# Patient Record
Sex: FEMALE | Race: WHITE | Employment: FULL TIME | URBAN - METROPOLITAN AREA
[De-identification: names, ages, dates, MRNs, and addresses within clinical notes are randomized per-mention and may not be internally consistent; named-entity substitution may affect disease eponyms.]

---

## 2021-08-27 ENCOUNTER — HOSPITAL ENCOUNTER (OUTPATIENT)
Dept: PHYSICAL THERAPY | Age: 36
Discharge: HOME | End: 2021-08-27
Payer: COMMERCIAL

## 2021-08-27 PROCEDURE — 97162 PT EVAL MOD COMPLEX 30 MIN: CPT

## 2021-08-27 PROCEDURE — 97110 THERAPEUTIC EXERCISES: CPT

## 2021-08-27 NOTE — PROGRESS NOTES
[facility]  [address]  [place], [address]  [phone] fax    Plan of Care/ Statement of Necessity for Physical Therapy Services    Patient name: Chandana Graves Start of Care: 2021   Referral source: Sherrin Runner, NP : 1985    Medical Diagnosis: Low back pain [M54.5]  Payor: Live [employer]k / Plan: Pito Reinoso / Product Type: HMO /  Onset Date:, recent exacerbation    Treatment Diagnosis: LBP   Prior Hospitalization: see medical history Provider#: [id number]   Medications: Verified on Patient summary List    Comorbidities: asthma, allergies, LBP   Prior Level of Function: functionally I with daily tasks,      The Plan of Care and following information is based on the information from the initial evaluation. Assessment/ austin information: 63 y/o female presents with long h/o off and on LBP since . No mechanism of injury is reported. The pt reports over the past 2 weeks she has had increased LBP and it does not seem to be going away. She reports pain radiating down B LEs to her knees at times. She denies any numbness, tingling, or weakness. Lumbar AROM flexion is full with minimal pain, extension 75% of full with pain, B SB and rotation 75% of full with pain. Decreased core engagement, decreased abdominal strength and diminished B glute strength is noted. Decreased flexibility of B LE quad and hip flexors. + tenderness to palpation over the lower lumbar paraspinals and over B glute max and piriformis. The pt will benefit from PT to address the aforementioned impairments.  .    Evaluation Complexity History MEDIUM  Complexity : 1-2 comorbidities / personal factors will impact the outcome/ POC ; Examination MEDIUM Complexity : 3 Standardized tests and measures addressing body structure, function, activity limitation and / or participation in recreation  ;Presentation MEDIUM Complexity : Evolving with changing characteristics  ; Clinical Decision Making MEDIUM Complexity : FOTO score of 26-74  Overall Complexity Rating: MEDIUM  Problem List: pain affecting function, decrease ROM, decrease strength, decrease ADL/ functional abilitiies, decrease activity tolerance and decrease flexibility/ joint mobility   Treatment Plan may include any combination of the following: Therapeutic exercise, Therapeutic activities, Neuromuscular re-education, Physical agent/modality, Manual therapy, Aquatic therapy, Patient education and Self Care training  Patient / Family readiness to learn indicated by: asking questions, trying to perform skills and interest  Persons(s) to be included in education: patient (P)  Barriers to Learning/Limitations: None  Patient Goal (s): Gain knowledge of appropriate stretches and find out the underlying cause  Patient Self Reported Health Status: fair  Rehabilitation Potential: fair    Short Term Goals: To be accomplished in 1 weeks:   1. The pt will be I and compliant with HEP     Long Term Goals: To be accomplished in 4 weeks:   1. Improve FOTO score to 69 for improved ability for daily tasks   2. The pt will report no limitation with performing moderate activities like pushing a vacuum. 3. The pt will demonstrate 4+/5 B glute max MMT to improve ability for daily tasks. 4. The pt will demonstrate ability to maintain neutral spine with performing lumbar stabs. Frequency / Duration: Patient to be seen 1-2 times per week for 3-4 weeks. Patient/ Caregiver education and instruction: Diagnosis, prognosis, self care, activity modification and exercises   [x]  Plan of care has been reviewed with ANGEL Wu, PT 8/27/2021 7:42 AM    ________________________________________________________________________    I certify that the above Therapy Services are being furnished while the patient is under my care.  I agree with the treatment plan and certify that this therapy is necessary.     [de-identified] Signature:____________Date:_________TIME:________     Pablo Essex, NP  ** Signature, Date and Time must be completed for valid certification **    Please sign and return to [facility] [address]  [place], [address]  [phone] fax

## 2021-08-31 ENCOUNTER — APPOINTMENT (OUTPATIENT)
Dept: PHYSICAL THERAPY | Age: 36
End: 2021-08-31
Payer: COMMERCIAL

## 2021-09-08 ENCOUNTER — HOSPITAL ENCOUNTER (OUTPATIENT)
Dept: PHYSICAL THERAPY | Age: 36
Discharge: HOME | End: 2021-09-08
Payer: COMMERCIAL

## 2021-09-08 PROCEDURE — 97112 NEUROMUSCULAR REEDUCATION: CPT

## 2021-09-08 PROCEDURE — 97110 THERAPEUTIC EXERCISES: CPT

## 2021-09-08 NOTE — PROGRESS NOTES
PT DAILY TREATMENT NOTE     Patient Name: Tl Cobb  Date:2021  : 1985  [x]  Patient  Verified  Payor: Migue Bazan / Plan: Natanael Hays / Product Type: HMO /    In time: 7:45   Out time: 8:26  Total Treatment Time (min): 41  Visit #: 2 of 4-8    Medicare/BCBS Only   Total Timed Codes (min): 41 1:1 Treatment Time: 39     Treatment Area: Low back pain [M54.5]    SUBJECTIVE  Pain Level (0-10 scale): 1-2  Any medication changes, allergies to medications, adverse drug reactions, diagnosis change, or new procedure performed?: [x] No    [] Yes (see summary sheet for update)  Subjective functional status/changes:   [] No changes reported  Pt reports having minimal pain currently and reports partial compliance with HEP. OBJECTIVE    15 min Therapeutic Exercise:  [x] See flow sheet :    Rationale: increase ROM and increase strength to improve the patients ability to perform ADLs    26 min Neuromuscular Re-education:  [x]  See flow sheet : core/glute stability and re-education exercises. Rationale: increase strength, improve coordination, improve balance and increase proprioception  to improve the patients ability to tolerate functional activities. With   [x] TE   [] TA   [x] neuro   [] other: Patient Education: [x] Review HEP    [] Progressed/Changed HEP based on:   [x] positioning   [x] body mechanics   [] transfers   [] heat/ice application    [] other:      Other Objective/Functional Measures: Initiated exercises/interventions per flow sheet. Good PPT form noted today. Pain Level (0-10 scale) post treatment: 0 soreness    ASSESSMENT/Changes in Function: Reported no pain post session today only soreness. Challenged with maintaining PPT with left LE march in supine versus the right. She moves slowly and with less fluidity with transfers to/from sitting and from the plinth secondary to tightness. Continue POC as tolerated to improve stability and pain.      Patient will continue to benefit from skilled PT services to modify and progress therapeutic interventions, address functional mobility deficits, address ROM deficits, address strength deficits, analyze and address soft tissue restrictions, analyze and cue movement patterns, analyze and modify body mechanics/ergonomics and assess and modify postural abnormalities to attain remaining goals. []  See Plan of Care  []  See progress note/recertification  []  See Discharge Summary         Progress towards goals / Updated goals:  Short Term Goals: To be accomplished in 1 weeks:   1. The pt will be I and compliant with HEP   IE issued HEP   Current: reports partial compliance 9/8/2021  Long Term Goals: To be accomplished in 4 weeks:   1. Improve FOTO score to 69 for improved ability for daily tasks   IE- 56   2. The pt will report no limitation with performing moderate activities like pushing a vacuum. IE- a little limited   3. The pt will demonstrate 4+/5 B glute max MMT to improve ability for daily tasks. IE- 4-/5 B   4. The pt will demonstrate ability to maintain neutral spine with performing lumbar stabs.     IE- poor core engagement    PLAN  [x]  Upgrade activities as tolerated     [x]  Continue plan of care  [x]  Update interventions per flow sheet       []  Discharge due to:_  []  Other:_      Chelsi Ortiz, PT 9/8/2021  7:49 AM    Future Appointments   Date Time Provider Shamika Falk   9/10/2021  7:00 AM Angelena Pilon, Gaylene Angelucci [facility]   9/14/2021  7:00 AM Nidia Palmer PTA [facility]   9/17/2021  7:00 AM Abram Quintana, PT [facility]

## 2021-09-10 ENCOUNTER — HOSPITAL ENCOUNTER (OUTPATIENT)
Dept: PHYSICAL THERAPY | Age: 36
Discharge: HOME | End: 2021-09-10
Payer: COMMERCIAL

## 2021-09-10 PROCEDURE — 97110 THERAPEUTIC EXERCISES: CPT

## 2021-09-10 PROCEDURE — 97112 NEUROMUSCULAR REEDUCATION: CPT

## 2021-09-10 NOTE — PROGRESS NOTES
PT DAILY TREATMENT NOTE     Patient Name: Leelee Miles  Date:9/10/2021  : 1985  [x]  Patient  Verified  Payor: Panda Rosario / Plan: Doretha Mess / Product Type: HMO /    In time:7:00 AM  Out time:7:42 AM  Total Treatment Time (min): 42  Visit #: 3 of 4-8    Medicare/BCBS Only   Total Timed Codes (min):  42 1:1 Treatment Time:  42       Treatment Area: Low back pain [M54.5]    SUBJECTIVE  Pain Level (0-10 scale): 2  Any medication changes, allergies to medications, adverse drug reactions, diagnosis change, or new procedure performed?: [x] No    [] Yes (see summary sheet for update)  Subjective functional status/changes:   [] No changes reported  Patient reports mild soreness after last visit. OBJECTIVE      10 min Therapeutic Exercise:  [x] See flow sheet :   Rationale: increase ROM, increase strength and improve coordination to improve the patients ability to increase ease with ADLs         32 min Neuromuscular Re-education:  [x]  See flow sheet : core and glute activities utilizing core align, reformer, and swiss ball   Rationale: increase strength, improve coordination, improve balance and increase proprioception  to improve the patients ability to improve glute and core awareness              With   [] TE   [] TA   [] neuro   [] other: Patient Education: [x] Review HEP    [] Progressed/Changed HEP based on:   [] positioning   [] body mechanics   [] transfers   [] heat/ice application    [] other:      Other Objective/Functional Measures:   glute max: right: 4+/5. Left, 4-/5      Pain Level (0-10 scale) post treatment: 0    ASSESSMENT/Changes in Function:   Patient with poor core awareness and glute instability noted. She does put forth good effort with session. She reports some \"spasms\" at low back, especially when rising from sit to stand. Frequent cueing required for lumbopelvic dissociation with reformer supine feet in straps, patient able to correct post cueing.      Patient will continue to benefit from skilled PT services to modify and progress therapeutic interventions, address functional mobility deficits, address ROM deficits, address strength deficits, analyze and address soft tissue restrictions, analyze and cue movement patterns, analyze and modify body mechanics/ergonomics and assess and modify postural abnormalities to attain remaining goals. []  See Plan of Care  []  See progress note/recertification  []  See Discharge Summary         Progress towards goals / Updated goals:  Short Term Goals: To be accomplished in 1 weeks:              1. The pt will be I and compliant with HEP              IE issued HEP              Current: reports partial compliance 9/8/2021  Long Term Goals: To be accomplished in 4 weeks:              1. Improve FOTO score to 69 for improved ability for daily tasks              IE- 56              2. The pt will report no limitation with performing moderate activities like pushing a vacuum. IE- a little limited              3. The pt will demonstrate 4+/5 B glute max MMT to improve ability for daily tasks. IE- 4-/5 B   Current: progressing, right: 4+/5. Left: 4-/5 (9/10/21)              4. The pt will demonstrate ability to maintain neutral spine with performing lumbar stabs.                IE- poor core engagement    PLAN  []  Upgrade activities as tolerated     [x]  Continue plan of care  []  Update interventions per flow sheet       []  Discharge due to:_  []  Other:_      René Hollingsworth 9/10/2021  7:02 AM    Future Appointments   Date Time Provider Shamika Falk   9/14/2021  7:00 AM [facility]   9/17/2021  7:00 AM [facility]awn Adair, PT [facility]

## 2021-09-14 ENCOUNTER — HOSPITAL ENCOUNTER (OUTPATIENT)
Dept: PHYSICAL THERAPY | Age: 36
Discharge: HOME | End: 2021-09-14
Payer: COMMERCIAL

## 2021-09-14 PROCEDURE — 97110 THERAPEUTIC EXERCISES: CPT

## 2021-09-14 PROCEDURE — 97112 NEUROMUSCULAR REEDUCATION: CPT

## 2021-09-14 NOTE — PROGRESS NOTES
PT DAILY TREATMENT NOTE     Patient Name: Angelica Johnston  Date:2021  : 1985  [x]  Patient  Verified  Payor: Shefali Schaeffer / Plan: Eleazar Mt / Product Type: HMO /    In time:7:00  Out time:7:40  Total Treatment Time (min): 40  Visit #: 4 of 4-8    Medicare/BCBS Only   Total Timed Codes (min):  40 1:1 Treatment Time:  40       Treatment Area: Low back pain [M54.5]    SUBJECTIVE  Pain Level (0-10 scale): 0/10  Any medication changes, allergies to medications, adverse drug reactions, diagnosis change, or new procedure performed?: [x] No    [] Yes (see summary sheet for update)  Subjective functional status/changes:   [] No changes reported  Pt reports compliance with HEP. Pt reports some pain this morning when driving to PT but reports no pain currently. OBJECTIVE    30 min Therapeutic Exercise:  [x] See flow sheet :   Rationale: increase ROM and increase strength to improve the patients ability to perform ADLs with increased ease. 10 min Neuromuscular Re-education:  [x]  See flow sheet :   Rationale: increase strength, improve coordination and increase proprioception  to improve the patients ability to perform functional activities with increased ease. With   [] TE   [] TA   [] neuro   [] other: Patient Education: [x] Review HEP    [] Progressed/Changed HEP based on:   [] positioning   [] body mechanics   [] transfers   [] heat/ice application    [] other:      Other Objective/Functional Measures: minimal vc's to correct form. Pain Level (0-10 scale) post treatment: 0/10    ASSESSMENT/Changes in Function: Pt demonstrates good control with PPT. Pt has reports of decreased symptoms since IE.       Patient will continue to benefit from skilled PT services to modify and progress therapeutic interventions, address functional mobility deficits, address ROM deficits, address strength deficits, analyze and address soft tissue restrictions, analyze and cue movement patterns and analyze and modify body mechanics/ergonomics to attain remaining goals. []  See Plan of Care  []  See progress note/recertification  []  See Discharge Summary         Progress towards goals / Updated goals:  Short Term Goals: To be accomplished in 1 weeks:              4. The pt will be I and compliant with HEP              IE issued HEP              YNZZODE: reports partial compliance 9/8/2021  Long Term Goals: To be accomplished in 4 weeks:              1. Improve FOTO score to 69 for improved ability for daily tasks              IE- 56              2. The pt will report no limitation with performing moderate activities like pushing a vacuum.               IE- a little limited   Current: pt reports no limitation. 09/14/2021              3. The pt will demonstrate 4+/5 B glute max MMT to improve ability for daily tasks.               IE- 4-/5 B              Current: progressing, right: 4+/5. Left: 4-/5 (9/10/21)              4. The pt will demonstrate ability to maintain neutral spine with performing lumbar stabs.                CV- poor core engagement    PLAN  []  Upgrade activities as tolerated     [x]  Continue plan of care  []  Update interventions per flow sheet       []  Discharge due to:_  []  Other:_      Harsh Almaraz, ANGEL 9/14/2021  7:05 AM    Future Appointments   Date Time Provider Shamika Falk   9/17/2021  7:00 AM Zaire Benoit, PT [facility]

## 2021-09-17 ENCOUNTER — HOSPITAL ENCOUNTER (OUTPATIENT)
Dept: PHYSICAL THERAPY | Age: 36
Discharge: HOME | End: 2021-09-17
Payer: COMMERCIAL

## 2021-09-17 PROCEDURE — 97112 NEUROMUSCULAR REEDUCATION: CPT

## 2021-09-17 PROCEDURE — 97110 THERAPEUTIC EXERCISES: CPT

## 2021-09-17 NOTE — PROGRESS NOTES
PT DAILY TREATMENT NOTE     Patient Name: Watson Stevenson  Date:2021  : 1985  [x]  Patient  Verified  Payor: Ailyn Bravo / Plan: Dakota Dias / Product Type: HMO /    In time:0700  Out time:744  Total Treatment Time (min): 44  Visit #: 5 of 8    Medicare/BCBS Only   Total Timed Codes (min):  44 1:1 Treatment Time:  44       Treatment Area: Low back pain [M54.5]    SUBJECTIVE  Pain Level (0-10 scale): 2 - 2.5  Any medication changes, allergies to medications, adverse drug reactions, diagnosis change, or new procedure performed?: [x] No    [] Yes (see summary sheet for update)  Subjective functional status/changes:   [] No changes reported  The pt reports PT is helping and she has noticed a \"definite difference\". Pain is not as constant. OBJECTIVE         32 min Therapeutic Exercise:  [x] See flow sheet :   Rationale: increase ROM and increase strength to improve the patients ability to perform ADL    12 min Neuromuscular Re-education:  [x]  See flow sheet : Core Align and Reformer series. Rationale: increase strength, improve coordination and increase proprioception  to improve the patients ability to perform ADL        With   [] TE   [] TA   [] neuro   [] other: Patient Education: [x] Review HEP    [] Progressed/Changed HEP based on:   [] positioning   [] body mechanics   [] transfers   [] heat/ice application    [] other:      Other Objective/Functional Measures: increased reps per flow sheet, increased Core Align resistance     Pain Level (0-10 scale) post treatment: 0    ASSESSMENT/Changes in Function: The pt is progressing well with PT. Decreased pain is reported and lumbar stability and glute strength are progressing. Good relief noted post treatment.       Patient will continue to benefit from skilled PT services to modify and progress therapeutic interventions, address functional mobility deficits, address ROM deficits, address strength deficits, analyze and address soft tissue restrictions, analyze and cue movement patterns, analyze and modify body mechanics/ergonomics and assess and modify postural abnormalities to attain remaining goals. []  See Plan of Care  []  See progress note/recertification  []  See Discharge Summary         Progress towards goals / Updated goals:  Short Term Goals: To be accomplished in 1 weeks:              8. The pt will be I and compliant with HEP              IE issued HEP              YIUTIAG: reports performing HEP on 9-17-21  Long Term Goals: To be accomplished in 4 weeks:              1. Improve FOTO score to 69 for improved ability for daily tasks              IE- 56              2. The pt will report no limitation with performing moderate activities like pushing a vacuum.               IE- a little limited              Current: pt reports no limitation. 09/14/2021              3. The pt will demonstrate 4+/5 B glute max MMT to improve ability for daily tasks.               IE- 4-/5 B              Current: progressing, right: 4+/5. Left: 4-/5 (9/10/21)              4. The pt will demonstrate ability to maintain neutral spine with performing lumbar stabs.             IE- poor core engagement   Current: improving as noted with therex 9-17-21    PLAN  [x]  Upgrade activities as tolerated     [x]  Continue plan of care  []  Update interventions per flow sheet       []  Discharge due to:_  []  Other:_      Sierra Cat, PT 9/17/2021  7:11 AM    No future appointments.

## 2021-09-27 ENCOUNTER — HOSPITAL ENCOUNTER (OUTPATIENT)
Dept: PHYSICAL THERAPY | Age: 36
Discharge: HOME | End: 2021-09-27
Payer: COMMERCIAL

## 2021-09-27 PROCEDURE — 97112 NEUROMUSCULAR REEDUCATION: CPT

## 2021-09-27 PROCEDURE — 97110 THERAPEUTIC EXERCISES: CPT

## 2021-09-27 NOTE — PROGRESS NOTES
[facility] [place]  [address]  [place], [address]  [phone] fax    Physical Therapy Progress Note  Patient name: Ashtyn Ortiz Start of Care: 2021   Referral source: Андрей Callejas NP : 1985   Medical/Treatment Diagnosis: Low back pain [M54.5]  Payor: BLUE CROSS / Plan: [employer] / Product Type: HMO /  Onset DJOB:1218, recent exacerbation             Prior Hospitalization: see medical history Provider#: [id number]   Medications: Verified on Patient Summary List    Comorbidities: asthma, allergies, LBP  Prior Level of Function:functionally I with daily tasks,   Visits from Start of Care: 6    Missed Visits: 0      Key Functional Changes:     Progress towards goals:  Short Term Goals: To be accomplished in 1 weeks:              1. The pt will be I and compliant with HEP              IE issued HEP              EOSZVZZ: reports performing HEP. Long Term Goals: To be accomplished in 4 weeks:              1. Improve FOTO score to 69 for improved ability for daily tasks              IE- 56              Current: Met, 94%.              2. The pt will report no limitation with performing moderate activities like pushing a vacuum.               IE- a little limited              Current: pt reports no limitation.              3. The pt will demonstrate 4+/5 B glute max MMT to improve ability for daily tasks.               IE- 4-/5 B              Current: progressing, right: 4+/5. Left: 4-/5.              4. The pt will demonstrate ability to maintain neutral spine with performing lumbar stabs.             ZL- poor core engagement              Current: improving as noted with therex. FOTO 94%. Pt reports 90% overall subjective improvement. Reports occasional twinges in low back, primarily with prolonged sitting/driving and sit to stand transfers.  Fair mechanics with sit to stands, when cued not to use UE's pt stated \"it feels weird not to use my arms. \" Presented with increased trunk flexion and poor eccentric control with sit to stands. Updated Goals: to be achieved in 2-4 weeks:   Long Term Goals: To be accomplished in 4 weeks:              1. The pt will demonstrate 4+/5 B glute max MMT to improve ability for daily tasks.               PN: progressing, right: 4+/5. Left: 4-/5.              2. The pt will demonstrate ability to maintain neutral spine with performing lumbar stabs.             UW: improving as noted with therex. 3. The pt will demonstrate proper squats and sit to stand mechanics to improve ease of performing (I) transfers   PN: Increased trunk flexion and poor eccentric control. ASSESSMENT/RECOMMENDATIONS:  Pt would benefit from continuing PT for additional 1x a week for 4 weeks to increase sitting/standing posture and squat mechanics to decrease risk of re-injury. [x]Continue therapy per initial plan/protocol at a frequency of  1 x per week for 2-4 weeks  []Continue therapy with the following recommended changes:_____________________      _____________________________________________________________________  []Discontinue therapy progressing towards or have reached established goals  []Discontinue therapy due to lack of appreciable progress towards goals  []Discontinue therapy due to lack of attendance or compliance  []Await Physician's recommendations/decisions regarding therapy  []Other:________________________________________________________________    Thank you for this referral.    Salud Prajapati, PTA 9/27/2021 3:55 PM  NOTE TO PHYSICIAN:  [address] [facility] [phone]  If you are unable to process this request in 24 hours please contact our office: [phone]    ? I have read the above report and request that my patient continue as recommended.   ? I have read the above report and request that my patient continue therapy with the following changes/special instructions:__________________________________________________________  ? I have read the above report and request that my patient be discharged from therapy.     Physicians signature: ______________________________Date: ______Time:______     Praveen Mckeon NP

## 2021-09-27 NOTE — PROGRESS NOTES
PT DAILY TREATMENT NOTE     Patient Name: Filemon Truong  Date:2021  : 1985  [x]  Patient  Verified  Payor: Mikhail Blanchard / Plan: Don Melendez / Product Type: HMO /    In time:3:00  Out time:3:45  Total Treatment Time (min): 45  Visit #: 6 of 8    Medicare/BCBS Only   Total Timed Codes (min):  45 1:1 Treatment Time:  41       Treatment Area: Low back pain [M54.5]    SUBJECTIVE  Pain Level (0-10 scale): 0/10  Any medication changes, allergies to medications, adverse drug reactions, diagnosis change, or new procedure performed?: [x] No    [] Yes (see summary sheet for update)  Subjective functional status/changes:   [] No changes reported  \"Today is the best I've felt in months. \"    OBJECTIVE    16 min Therapeutic Exercise:  [x] See flow sheet :   Rationale: increase ROM and increase strength to improve the patients ability to perform ADL's.    25 min Neuromuscular Re-education:  [x]  See flow sheet : Sit to stands, CoreAlign, Reformer. Rationale: increase strength, improve coordination and increase proprioception  to improve the patients ability to perform functional activities. With   [x] TE   [] TA   [] neuro   [] other: Patient Education: [x] Review HEP    [] Progressed/Changed HEP based on:   [] positioning   [] body mechanics   [] transfers   [] heat/ice application    [] other:      Other Objective/Functional Measures: FOTO 94%. Pt reports 90% overall subjective improvement. Reports occasional twinges in low back, primarily with prolonged sitting/driving and sit to stand transfers. Fair mechanics with sit to stands, when cued not to use UE's pt stated \"it feels weird not to use my arms. \" Presented with increased trunk flexion and poor eccentric control with sit to stands. Pt has made significant improvement since IE. Pt reports 90% improvement.  Pt would benefit from continuing PT for additional 1x a week for 4 weeks to increase sitting/standing posture and squat mechanics to decrease risk of re-injury. Pain Level (0-10 scale) post treatment: 0/10     ASSESSMENT/Changes in Function:      []  See Plan of Care  [x]  See progress note/recertification  []  See Discharge Summary         Progress towards goals / Updated goals:  Short Term Goals: To be accomplished in 1 weeks:              2. The pt will be I and compliant with HEP              IE issued HEP              [id number]: reports performing HEP on 9-17-21  Long Term Goals: To be accomplished in 4 weeks:              1. Improve FOTO score to 69 for improved ability for daily tasks              IE- 56   Current: Met, 94%. 9/27/2021              2. The pt will report no limitation with performing moderate activities like pushing a vacuum.               IE- a little limited              Current: pt reports no limitation. 09/14/2021              3. The pt will demonstrate 4+/5 B glute max MMT to improve ability for daily tasks.               IE- 4-/5 B              Current: progressing, right: 4+/5. Left: 4-/5 (9/10/21)              4. The pt will demonstrate ability to maintain neutral spine with performing lumbar stabs.                ID- poor core engagement              Current: improving as noted with therex 9-17-21    PLAN  []  Upgrade activities as tolerated     [x]  Continue plan of care  []  Update interventions per flow sheet       []  Discharge due to:_  []  Other:_      General Jarett PTA 9/27/2021  3:06 PM    Future Appointments   Date Time Provider Shamika Falk   9/29/2021  3:45 PM Gonzalez Ochoa PTA [facility]   10/4/2021  7:00 AM Tj Guerrero, PT [facility]

## 2021-09-29 ENCOUNTER — HOSPITAL ENCOUNTER (OUTPATIENT)
Dept: PHYSICAL THERAPY | Age: 36
Discharge: HOME | End: 2021-09-29
Payer: COMMERCIAL

## 2021-09-29 PROCEDURE — 97110 THERAPEUTIC EXERCISES: CPT

## 2021-09-29 PROCEDURE — 97112 NEUROMUSCULAR REEDUCATION: CPT

## 2021-09-29 NOTE — PROGRESS NOTES
PT DAILY TREATMENT NOTE     Patient Name: Chelsey Lucio  Date:2021  : 1985  [x]  Patient  Verified  Payor: Aldo Collazo / Plan: Nory Quale / Product Type: HMO /    In time:3:45  Out time:4:28  Total Treatment Time (min): 43  Visit #: 1 of 2-4    Medicare/BCBS Only   Total Timed Codes (min):  43 1:1 Treatment Time:  43       Treatment Area: Low back pain [M54.5]    SUBJECTIVE  Pain Level (0-10 scale): 0/10  Any medication changes, allergies to medications, adverse drug reactions, diagnosis change, or new procedure performed?: [x] No    [] Yes (see summary sheet for update)  Subjective functional status/changes:   [] No changes reported  Pt reports no new complaints of pain. Pt reports compliance with HEP. OBJECTIVE    33 min Therapeutic Exercise:  [x] See flow sheet :   Rationale: increase ROM and increase strength to improve the patients ability to perform ADLs with increased ease. 10 min Neuromuscular Re-education:  [x]  See flow sheet :   Rationale: increase strength, improve coordination and increase proprioception  to improve the patients ability to perform ADLs with increased ease. With   [] TE   [] TA   [] neuro   [] other: Patient Education: [x] Review HEP    [] Progressed/Changed HEP based on:   [] positioning   [] body mechanics   [] transfers   [] heat/ice application    [] other:      Other Objective/Functional Measures: Frequent LOB with seated SB exercises. Pain Level (0-10 scale) post treatment: 0/10    ASSESSMENT/Changes in Function: Pt demonstrates good form with PPT. Pt has no adverse reaction to treatment.      Patient will continue to benefit from skilled PT services to modify and progress therapeutic interventions, address functional mobility deficits, address ROM deficits, address strength deficits, analyze and address soft tissue restrictions, analyze and cue movement patterns and analyze and modify body mechanics/ergonomics to attain remaining goals.     []  See Plan of Care  []  See progress note/recertification  []  See Discharge Summary         Progress towards goals / Updated goals:  Updated Goals: to be achieved in 2-4 weeks:              Long Term Goals: To be accomplished in 4 weeks:              1. The pt will demonstrate 4+/5 B glute max MMT to improve ability for daily tasks.               PN: progressing, right: 4+/5. Left: 4-/5.              2. The pt will demonstrate ability to maintain neutral spine with performing lumbar stabs.             UV: improving as noted with therex. 3. The pt will demonstrate proper squats and sit to stand mechanics to improve ease of performing (I) transfers              PN: Increased trunk flexion and poor eccentric control.     PLAN  []  Upgrade activities as tolerated     [x]  Continue plan of care  []  Update interventions per flow sheet       []  Discharge due to:_  []  Other:_      Irina Tijerina, PTA 9/29/2021  3:49 PM    Future Appointments   Date Time Provider Shamika Moultoni   10/4/2021  7:00 AM Huang Lopez, PT [facility]   10/11/2021  3:45 PM Malick Landin, PTA [facility]   10/18/2021  3:45 PM Emerald Taylor, PTA [facility]   10/25/2021  3:45 PM Malick Ladnin, PTA [facility]

## 2021-10-04 ENCOUNTER — HOSPITAL ENCOUNTER (OUTPATIENT)
Dept: PHYSICAL THERAPY | Age: 36
Discharge: HOME | End: 2021-10-04
Payer: COMMERCIAL

## 2021-10-04 PROCEDURE — 97110 THERAPEUTIC EXERCISES: CPT

## 2021-10-04 PROCEDURE — 97112 NEUROMUSCULAR REEDUCATION: CPT

## 2021-10-04 NOTE — PROGRESS NOTES
PT DAILY TREATMENT NOTE     Patient Name: Edmond Garcia  Date:10/4/2021  : 1985  [x]  Patient  Verified  Payor: Brooklyn Traore / Plan: Ivory Grounds / Product Type: HMO /    In time:0700  Out time:742  Total Treatment Time (min): 42  Visit #: 2 of 2-4    Medicare/BCBS Only   Total Timed Codes (min):  42 1:1 Treatment Time:  42       Treatment Area: Low back pain [M54.5]    SUBJECTIVE  Pain Level (0-10 scale): 1  Any medication changes, allergies to medications, adverse drug reactions, diagnosis change, or new procedure performed?: [x] No    [] Yes (see summary sheet for update)  Subjective functional status/changes:   [] No changes reported  The pt reports some minor pain this morning. She states it's the first time she has had pain in a couple of weeks. OBJECTIVE      30 min Therapeutic Exercise:  [x] See flow sheet :   Rationale: increase ROM and increase strength to improve the patients ability to perform ADL     12 min Neuromuscular Re-education:  [x]  See flow sheet :   Rationale: increase strength, improve coordination and increase proprioception  to improve the patients ability to perform ADL            With   [] TE   [] TA   [] neuro   [] other: Patient Education: [x] Review HEP    [] Progressed/Changed HEP based on:   [] positioning   [] body mechanics   [] transfers   [] heat/ice application    [] other:      Other Objective/Functional Measures: added march with PPT. increased Reformer reps     Pain Level (0-10 scale) post treatment: 0.5    ASSESSMENT/Changes in Function:  The pt is progressing with lumbar stability but is still limited. Overall her pain has been well controlled but she does report some minor pain upon arrival. Her pain diminished post treatment.      Patient will continue to benefit from skilled PT services to modify and progress therapeutic interventions, address functional mobility deficits, address ROM deficits, address strength deficits, analyze and address soft tissue restrictions and analyze and cue movement patterns to attain remaining goals. []  See Plan of Care  []  See progress note/recertification  []  See Discharge Summary         Progress towards goals / Updated goals:  Updated Goals: to be achieved in 2-4 weeks:              Long Term Goals: To be accomplished in 4 weeks:              1. The pt will demonstrate 4+/5 B glute max MMT to improve ability for daily tasks.               PN: progressing, right: 4+/5. Left: 4-/5.              2. The pt will demonstrate ability to maintain neutral spine with performing lumbar stabs.             PN: improving as noted with therex. Current: progressing improvement noted  10-4-21              3. The pt will demonstrate proper squats and sit to stand mechanics to improve ease of performing (I) transfers              PN: Increased trunk flexion and poor eccentric control.     PLAN  []  Upgrade activities as tolerated     [x]  Continue plan of care  []  Update interventions per flow sheet       []  Discharge due to:_  []  Other:_      Maksim Trinidad, PT 10/4/2021  7:11 AM    Future Appointments   Date Time Provider Shamika Falk   10/11/2021  3:45 PM Osmel Glover PTA [facility]   10/18/2021  3:45 PM Stef Peraza PTA [facility]   10/25/2021  3:45 PM Osmel Glover PTA [facility]

## 2021-10-11 ENCOUNTER — HOSPITAL ENCOUNTER (OUTPATIENT)
Dept: PHYSICAL THERAPY | Age: 36
Discharge: HOME | End: 2021-10-11
Payer: COMMERCIAL

## 2021-10-11 PROCEDURE — 97112 NEUROMUSCULAR REEDUCATION: CPT

## 2021-10-11 PROCEDURE — 97110 THERAPEUTIC EXERCISES: CPT

## 2021-10-11 NOTE — PROGRESS NOTES
PT DAILY TREATMENT NOTE     Patient Name: Flores Mosley  Date:10/11/2021  : 1985  [x]  Patient  Verified  Payor: Isabel Chávez / Plan: Christiano Stout / Product Type: HMO /    In time:3:45  Out time:4:30  Total Treatment Time (min): 45  Visit #: 3 of 2-4    Medicare/BCBS Only   Total Timed Codes (min):  45 1:1 Treatment Time:  45       Treatment Area: Low back pain [M54.5]    SUBJECTIVE  Pain Level (0-10 scale): 0/10  Any medication changes, allergies to medications, adverse drug reactions, diagnosis change, or new procedure performed?: [x] No    [] Yes (see summary sheet for update)  Subjective functional status/changes:   [] No changes reported  Pt reports no new complaints of pain. OBJECTIVE    35 min Therapeutic Exercise:  [x] See flow sheet :   Rationale: increase ROM and increase strength to improve the patients ability to perform ADLs with increased ease. 10 min Neuromuscular Re-education:  [x]  See flow sheet :   Rationale: increase strength, improve coordination and increase proprioception  to improve the patients ability to perform ADLs with increased ease. With   [] TE   [] TA   [] neuro   [] other: Patient Education: [x] Review HEP    [] Progressed/Changed HEP based on:   [] positioning   [] body mechanics   [] transfers   [] heat/ice application    [] other:      Other Objective/Functional Measures:      Pain Level (0-10 scale) post treatment: 0/10    ASSESSMENT/Changes in Function: Pt continues to have difficulty performing SB exercises due to core weakness. Patient will continue to benefit from skilled PT services to modify and progress therapeutic interventions, address functional mobility deficits, address ROM deficits, address strength deficits, analyze and address soft tissue restrictions, analyze and cue movement patterns and analyze and modify body mechanics/ergonomics to attain remaining goals.      []  See Plan of Care  []  See progress note/recertification  [] See Discharge Summary         Progress towards goals / Updated goals:  Updated Goals: to be achieved in 2-4 weeks:              Long Term Goals: To be accomplished in 4 weeks:              1. The pt will demonstrate 4+/5 B glute max MMT to improve ability for daily tasks.               PN: progressing, right: 4+/5. Left: 4-/5.              2. The pt will demonstrate ability to maintain neutral spine with performing lumbar stabs.             PN: improving as noted with therex. Current: progressing improvement noted  10-4-21              3. The pt will demonstrate proper squats and sit to stand mechanics to improve ease of performing (I) transfers              PN: Increased trunk flexion and poor eccentric control.     PLAN  []  Upgrade activities as tolerated     [x]  Continue plan of care  []  Update interventions per flow sheet       []  Discharge due to:_  []  Other:_      Martha Robles PTA 10/11/2021  3:52 PM    Future Appointments   Date Time Provider Shamika Falk   10/18/2021  3:45 PM [facility]   10/25/2021  3:45 PM [facility]

## 2021-10-18 ENCOUNTER — HOSPITAL ENCOUNTER (OUTPATIENT)
Dept: PHYSICAL THERAPY | Age: 36
Discharge: HOME | End: 2021-10-18
Payer: COMMERCIAL

## 2021-10-18 PROCEDURE — 97112 NEUROMUSCULAR REEDUCATION: CPT

## 2021-10-18 PROCEDURE — 97110 THERAPEUTIC EXERCISES: CPT

## 2021-10-18 NOTE — PROGRESS NOTES
[facility]   [address]  [place], [address]  [phone] fax    Physical Therapy Discharge Summary  Patient name: Rocio Dueñas Start of Care: 2021   Referral source: Fran Yan NP : 1985   Medical/Treatment Diagnosis: Low back pain [M54.5]  Payor: Greg Lehman / Plan: Salud Hernandez / Product Type: HMO /  Onset [id number], recent exacerbation     Prior Hospitalization: see medical history Provider#: [id number]   Medications: Verified on Patient Summary List    Comorbidities: asthma, allergies, LBP  Prior Level of Function:functionally I with daily tasks,   Visits from Start of Care: 10    Missed Visits: 0  Reporting Period : 2021 to 10/18/2021      Summary of Care:    [facility] be accomplished in 4 weeks:              1. The pt will demonstrate 4+/5 B glute max MMT to improve ability for daily tasks.               PN: progressing, right: 4+/5. Left: 4-/5. Discharge: Met, MMT Glute max Left 4+/5, Right 4+/5. 10/18/2021              2. The pt will demonstrate ability to maintain neutral spine with performing lumbar stabs.             PN: improving as noted with therex.              Discharge: progressing improvement noted  10-4-21              3. The pt will demonstrate proper squats and sit to stand mechanics to improve ease of performing (I) transfers              PN: Increased trunk flexion and poor eccentric control. Discharge: Demonstrates good squat and sit to stand mechanics void of pain. 10/18/2021      Pt has made significant progress since IE. Pt reports 90-95% overall subjective improvement. MMT Glute max Left 4+/5, Right 4+/5. Met all LTG's. Demonstrates good squat and sit to stand mechanics void of pain. Met LTG's #1 and #3.  Partially met LTG #2, core fatigues quickly and unable to maintain neutral throughout duration of treatment however pt takes short breaks to reset core activation. D/C to HEP at this time. Pt had no further questions or concerns and thanked staff for services provided.     ASSESSMENT/RECOMMENDATIONS:  [x]Discontinue therapy: [x]Patient has reached or is progressing toward set goals      []Patient is non-compliant or has abdicated      []Due to lack of appreciable progress towards set goals    Janette Waggoner, PTA 10/18/2021 4:40 PM

## 2021-10-18 NOTE — PROGRESS NOTES
PT DAILY TREATMENT NOTE     Patient Name: Pretty Mcleod  Date:10/18/2021  : 1985  [x]  Patient  Verified  Payor: Lesly Christensen / Plan: Alicia Antoine / Product Type: HMO /    In time:3:47  Out time:4:28  Total Treatment Time (min): 41  Visit #: 4 of 3-4    Medicare/BCBS Only   Total Timed Codes (min):  41 1:1 Treatment Time:  41       Treatment Area: Low back pain [M54.5]    SUBJECTIVE  Pain Level (0-10 scale): 0/10  Any medication changes, allergies to medications, adverse drug reactions, diagnosis change, or new procedure performed?: [x] No    [] Yes (see summary sheet for update)  Subjective functional status/changes:   [] No changes reported  \"Been feeling really good. Haven't had any back pain lately. \"    OBJECTIVE    25 min Therapeutic Exercise:  [x] See flow sheet :   Rationale: increase ROM and increase strength to improve the patients ability to perform ADL's.    16 min Neuromuscular Re-education:  [x]  See flow sheet : CoreAlign, Reformer. Rationale: increase strength, improve coordination and increase proprioception  to improve the patients ability to perform functional activities. With   [x] TE   [] TA   [] neuro   [] other: Patient Education: [x] Review HEP    [] Progressed/Changed HEP based on:   [] positioning   [] body mechanics   [] transfers   [] heat/ice application    [] other:      Other Objective/Functional Measures: Pt has made significant progress since IE. Pt reports 90-95% overall subjective improvement. MMT Glute max Left 4+/5, Right 4+/5. Met all LTG's. Demonstrates good squat and sit to stand mechanics void of pain. Met LTG's #1 and #3. Partially met LTG #2, core fatigues quickly and unable to maintain neutral throughout duration of treatment however pt takes short breaks to reset core activation. D/C to HEP at this time. Pt had no further questions or concerns and thanked staff for services provided.     Pain Level (0-10 scale) post treatment: 0/10    ASSESSMENT/Changes in Function:     []  See Plan of Care  []  See progress note/recertification  [x]  See Discharge Summary         Progress towards goals / Updated goals:  Updated Goals: to be achieved in 2-4 weeks:              Long Term Goals: To be accomplished in 4 weeks:              1. The pt will demonstrate 4+/5 B glute max MMT to improve ability for daily tasks.               PN: progressing, right: 4+/5. Left: 4-/5. Current: Met, MMT Glute max Left 4+/5, Right 4+/5. 10/18/2021              2. The pt will demonstrate ability to maintain neutral spine with performing lumbar stabs.             PN: improving as noted with therex.              SVGTKLA: progressing improvement noted  10-4-21              3. The pt will demonstrate proper squats and sit to stand mechanics to improve ease of performing (I) transfers              PN: Increased trunk flexion and poor eccentric control. Current: Demonstrates good squat and sit to stand mechanics void of pain. 10/18/2021       PLAN  []  Upgrade activities as tolerated     []  Continue plan of care  []  Update interventions per flow sheet       [x]  Discharge due to: Nearly met all LTG's, D/C to HEP.   []  Other:_      Hoa Roach PTA 10/18/2021  3:46 PM    Future Appointments   Date Time Provider Shamika Falk   10/25/2021  3:45 PM Cindy Burton PTA [facility]

## 2021-10-25 ENCOUNTER — APPOINTMENT (OUTPATIENT)
Dept: PHYSICAL THERAPY | Age: 36
End: 2021-10-25
Payer: COMMERCIAL

## 2022-10-05 ENCOUNTER — HOSPITAL ENCOUNTER (OUTPATIENT)
Dept: LAB | Age: 37
Discharge: HOME | End: 2022-10-05

## 2022-10-05 LAB — XX-LABCORP SPECIMEN COL,LCBCF: NORMAL

## 2022-10-05 PROCEDURE — 99001 SPECIMEN HANDLING PT-LAB: CPT

## 2023-10-19 ENCOUNTER — HOSPITAL ENCOUNTER (OUTPATIENT)
Facility: HOSPITAL | Age: 38
Discharge: HOME | End: 2023-10-22

## 2023-10-19 LAB — LABCORP SPECIMEN COLLECTION: NORMAL

## 2024-09-30 ENCOUNTER — HOSPITAL ENCOUNTER (OUTPATIENT)
Facility: HOSPITAL | Age: 39
Discharge: HOME | End: 2024-10-03

## 2024-09-30 DIAGNOSIS — N92.0 EXCESSIVE AND FREQUENT MENSTRUATION WITH REGULAR CYCLE: ICD-10-CM

## 2024-10-16 ENCOUNTER — HOSPITAL ENCOUNTER (OUTPATIENT)
Facility: HOSPITAL | Age: 39
Discharge: HOME | End: 2024-10-19
Payer: COMMERCIAL

## 2024-10-16 DIAGNOSIS — N92.0 EXCESSIVE AND FREQUENT MENSTRUATION WITH REGULAR CYCLE: ICD-10-CM

## 2024-10-16 PROCEDURE — 93975 VASCULAR STUDY: CPT
